# Patient Record
Sex: MALE | Race: BLACK OR AFRICAN AMERICAN | NOT HISPANIC OR LATINO | Employment: OTHER | URBAN - METROPOLITAN AREA
[De-identification: names, ages, dates, MRNs, and addresses within clinical notes are randomized per-mention and may not be internally consistent; named-entity substitution may affect disease eponyms.]

---

## 2024-02-04 ENCOUNTER — HOSPITAL ENCOUNTER (EMERGENCY)
Facility: HOSPITAL | Age: 24
Discharge: HOME | End: 2024-02-04
Attending: EMERGENCY MEDICINE
Payer: OTHER GOVERNMENT

## 2024-02-04 VITALS
SYSTOLIC BLOOD PRESSURE: 129 MMHG | TEMPERATURE: 99 F | BODY MASS INDEX: 24.92 KG/M2 | HEART RATE: 65 BPM | HEIGHT: 71 IN | WEIGHT: 178 LBS | DIASTOLIC BLOOD PRESSURE: 70 MMHG | OXYGEN SATURATION: 97 % | RESPIRATION RATE: 18 BRPM

## 2024-02-04 DIAGNOSIS — M25.562 ACUTE PAIN OF LEFT KNEE: ICD-10-CM

## 2024-02-04 DIAGNOSIS — S89.90XA KNEE INJURY: Primary | ICD-10-CM

## 2024-02-04 PROCEDURE — 99284 EMERGENCY DEPT VISIT MOD MDM: CPT | Mod: 25

## 2024-02-04 RX ORDER — LIDOCAINE 50 MG/G
1 PATCH TOPICAL ONCE
Qty: 15 PATCH | Refills: 0 | Status: SHIPPED | OUTPATIENT
Start: 2024-02-04 | End: 2024-02-04

## 2024-02-04 RX ORDER — IBUPROFEN 600 MG/1
600 TABLET ORAL EVERY 6 HOURS PRN
Qty: 30 TABLET | Refills: 0 | Status: SHIPPED | OUTPATIENT
Start: 2024-02-04
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
## 2024-02-04 NOTE — DISCHARGE INSTRUCTIONS

## 2024-02-04 NOTE — Clinical Note
"Mitchell Jiang" Ryan was seen and treated in our emergency department on 2/4/2024.  He may return to work on 02/05/2024.       If you have any questions or concerns, please don't hesitate to call.      Chanda Farrell PA-C"

## 2024-04-21 ENCOUNTER — HOSPITAL ENCOUNTER (EMERGENCY)
Facility: HOSPITAL | Age: 24
Discharge: HOME | End: 2024-04-21
Attending: EMERGENCY MEDICINE
Payer: OTHER GOVERNMENT

## 2024-04-21 VITALS
OXYGEN SATURATION: 97 % | SYSTOLIC BLOOD PRESSURE: 129 MMHG | WEIGHT: 176 LBS | HEART RATE: 59 BPM | BODY MASS INDEX: 25.2 KG/M2 | HEIGHT: 70 IN | RESPIRATION RATE: 18 BRPM | DIASTOLIC BLOOD PRESSURE: 63 MMHG | TEMPERATURE: 98 F

## 2024-04-21 DIAGNOSIS — G89.29 CHRONIC PAIN OF LEFT KNEE: Primary | ICD-10-CM

## 2024-04-21 DIAGNOSIS — M25.562 CHRONIC PAIN OF LEFT KNEE: Primary | ICD-10-CM

## 2024-04-21 DIAGNOSIS — M25.362 KNEE INSTABILITY, LEFT: ICD-10-CM

## 2024-04-21 PROCEDURE — 99283 EMERGENCY DEPT VISIT LOW MDM: CPT | Mod: 25

## 2024-04-21 RX ORDER — ACETAMINOPHEN 500 MG
1000 TABLET ORAL
Status: DISCONTINUED | OUTPATIENT
Start: 2024-04-21 | End: 2024-04-21 | Stop reason: HOSPADM

## 2024-04-21 NOTE — DISCHARGE INSTRUCTIONS
We know that you have many choices and are honored that you chose us. We hope that we met or exceeded your expectations and goals for this visit and will keep the [facility] family in mind for your future needs and those of your family and friends.     - Dr. Reese

## 2024-04-21 NOTE — ED PROVIDER NOTES
Emergency Department Provider Note    Mitchell Davis   24 y.o. male   [id number]      4/21/2024       History     This history was obtained from the patient without limitations.      He is a 24-year-old with no pertinent past medical history who presents by personal transportation.  He complains of left knee pain and instability with running.  This problem began a couple of years ago but has worsened over the past 2 months.  He has improvement with over-the-counter analgesics but no resolution. He is in the [employer], so it is affecting his ability to engage in physical training.          No past medical history on file.   No past surgical history on file.   No family history on file.   Social History     Socioeconomic History    Marital status: Single   Tobacco Use    Smoking status: Never    Smokeless tobacco: Never   Substance and Sexual Activity    Alcohol use: Not Currently     Comment: socially    Drug use: Never    Sexual activity: Yes      Review of patient's allergies indicates:  No Known Allergies        Physical Examination     Initial Vitals [04/21/24 0849]   BP Pulse Resp Temp SpO2   129/63 (!) 59 18 98.4 °F (36.9 °C) 97 %      MAP       --           Physical Exam    Nursing note and vitals reviewed.  Constitutional: He is not diaphoretic. No distress.   Cardiovascular:            Pulses:       Dorsalis pedis pulses are 2+ on the left side.        Posterior tibial pulses are 2+ on the left side.   Musculoskeletal:      Left knee: No swelling, deformity or crepitus. Normal range of motion. No tenderness. No LCL laxity, MCL laxity, ACL laxity or PCL laxity.Normal alignment.      Instability Tests: Anterior drawer test negative. Posterior drawer test negative.      Left lower leg: No swelling, deformity or tenderness.              Labs     None     Imaging     Imaging Results              X-Ray Knee 3 View Left (In process)                       ED Course     The patient received the following  medications:  Medications   acetaminophen tablet 1,000 mg (has no administration in time range)         ED Course as of 04/21/24 0915   Sun Apr 21, 2024   0911 X-Ray Knee 3 View Left  Independently interpreted by me:   Normal alignment.  No fractures.  No soft tissue swelling.  No radiopaque foreign bodies. [LP]      ED Course User Index  [LP] Teja Reese III, MD        Medical Decision Making                 Medical Decision Making  Problems Addressed:  Chronic pain of left knee: chronic illness or injury  Knee instability, left: chronic illness or injury    Risk  OTC drugs.              Diagnoses       ICD-10-CM ICD-9-CM   1. Chronic pain of left knee  M25.562 719.46    G89.29 338.29   2. Knee instability, left  M25.362 718.86         Dispostion      ED Disposition Condition    Discharge Stable            ED Prescriptions    None         Follow-up Information       Follow up With Specialties Details Why Contact Info Additional Information    [facility]  An [facility] representative should contact you in the coming days to assist with scheduling an appointment at sports medicine clinic.  Call the above number for assistance if you do not hear from someone within 1 week. [address]  [facility] [address]  [phone] Please park in surface lot or garage and check in on the first floor, Building [address]    ER   Return to the ER if your condition worsens or you have any other concerns that you feel need immediate attention.                Teja Reese III, MD  04/21/24 0915